# Patient Record
Sex: FEMALE | Race: WHITE | Employment: FULL TIME | URBAN - METROPOLITAN AREA
[De-identification: names, ages, dates, MRNs, and addresses within clinical notes are randomized per-mention and may not be internally consistent; named-entity substitution may affect disease eponyms.]

---

## 2017-01-16 PROCEDURE — 36415 COLL VENOUS BLD VENIPUNCTURE: CPT | Performed by: OTHER

## 2017-01-16 PROCEDURE — 80177 DRUG SCRN QUAN LEVETIRACETAM: CPT | Performed by: OTHER

## 2018-03-06 PROCEDURE — 36415 COLL VENOUS BLD VENIPUNCTURE: CPT | Performed by: OTHER

## 2018-03-06 PROCEDURE — 80175 DRUG SCREEN QUAN LAMOTRIGINE: CPT | Performed by: OTHER

## 2019-02-28 PROCEDURE — 36415 COLL VENOUS BLD VENIPUNCTURE: CPT | Performed by: OTHER

## 2019-02-28 PROCEDURE — 80175 DRUG SCREEN QUAN LAMOTRIGINE: CPT | Performed by: OTHER

## 2019-07-02 PROCEDURE — 87624 HPV HI-RISK TYP POOLED RSLT: CPT | Performed by: OBSTETRICS & GYNECOLOGY

## 2019-07-02 PROCEDURE — 88175 CYTOPATH C/V AUTO FLUID REDO: CPT | Performed by: OBSTETRICS & GYNECOLOGY

## 2021-01-18 PROBLEM — F41.9 ANXIETY: Status: ACTIVE | Noted: 2021-01-18

## 2021-01-18 PROBLEM — G40.909 SEIZURE DISORDER (HCC): Status: ACTIVE | Noted: 2021-01-18

## 2021-04-22 ENCOUNTER — TELEPHONE (OUTPATIENT)
Dept: OBGYN UNIT | Facility: HOSPITAL | Age: 31
End: 2021-04-22

## 2021-04-22 NOTE — PROGRESS NOTES
Pt contacted to inform of SHARE support services available and condolences expressed. Pt also advised that she will be asked to sign a Fetal Death Disposition Form upon admit. Choices explained, and pt verbalizes understanding. Pt states \"she doesn't think she will be using a  home\" due to the gestational age. She is aware that SHARE schedules and information are being mailed to her, and that I am available for follow up.

## 2021-04-23 ENCOUNTER — LABORATORY ENCOUNTER (OUTPATIENT)
Dept: LAB | Age: 31
End: 2021-04-23
Payer: COMMERCIAL

## 2021-04-23 ENCOUNTER — LAB ENCOUNTER (OUTPATIENT)
Dept: LAB | Age: 31
End: 2021-04-23
Attending: OBSTETRICS & GYNECOLOGY
Payer: COMMERCIAL

## 2021-04-23 DIAGNOSIS — O02.1 MISSED ABORTION: ICD-10-CM

## 2021-04-23 PROCEDURE — 36415 COLL VENOUS BLD VENIPUNCTURE: CPT

## 2021-04-23 PROCEDURE — 85027 COMPLETE CBC AUTOMATED: CPT

## 2021-04-26 ENCOUNTER — ANESTHESIA EVENT (OUTPATIENT)
Dept: SURGERY | Facility: HOSPITAL | Age: 31
End: 2021-04-26
Payer: COMMERCIAL

## 2021-04-26 ENCOUNTER — HOSPITAL ENCOUNTER (OUTPATIENT)
Facility: HOSPITAL | Age: 31
Setting detail: HOSPITAL OUTPATIENT SURGERY
Discharge: HOME | End: 2021-04-26
Attending: OBSTETRICS & GYNECOLOGY | Admitting: OBSTETRICS & GYNECOLOGY
Payer: COMMERCIAL

## 2021-04-26 ENCOUNTER — ANESTHESIA (OUTPATIENT)
Dept: SURGERY | Facility: HOSPITAL | Age: 31
End: 2021-04-26
Payer: COMMERCIAL

## 2021-04-26 VITALS
TEMPERATURE: 98 F | BODY MASS INDEX: 27.03 KG/M2 | WEIGHT: 174.25 LBS | DIASTOLIC BLOOD PRESSURE: 72 MMHG | RESPIRATION RATE: 16 BRPM | SYSTOLIC BLOOD PRESSURE: 116 MMHG | OXYGEN SATURATION: 100 % | HEART RATE: 53 BPM | HEIGHT: 67.5 IN

## 2021-04-26 DIAGNOSIS — O02.1 MISSED ABORTION: Primary | ICD-10-CM

## 2021-04-26 PROCEDURE — 86900 BLOOD TYPING SEROLOGIC ABO: CPT | Performed by: OBSTETRICS & GYNECOLOGY

## 2021-04-26 PROCEDURE — 86901 BLOOD TYPING SEROLOGIC RH(D): CPT | Performed by: OBSTETRICS & GYNECOLOGY

## 2021-04-26 PROCEDURE — 10D17ZZ EXTRACTION OF PRODUCTS OF CONCEPTION, RETAINED, VIA NATURAL OR ARTIFICIAL OPENING: ICD-10-PCS | Performed by: OBSTETRICS & GYNECOLOGY

## 2021-04-26 PROCEDURE — 88305 TISSUE EXAM BY PATHOLOGIST: CPT | Performed by: OBSTETRICS & GYNECOLOGY

## 2021-04-26 PROCEDURE — 86850 RBC ANTIBODY SCREEN: CPT | Performed by: OBSTETRICS & GYNECOLOGY

## 2021-04-26 RX ORDER — ONDANSETRON 2 MG/ML
4 INJECTION INTRAMUSCULAR; INTRAVENOUS AS NEEDED
Status: DISCONTINUED | OUTPATIENT
Start: 2021-04-26 | End: 2021-04-26

## 2021-04-26 RX ORDER — ACETAMINOPHEN 500 MG
1000 TABLET ORAL ONCE
Status: DISCONTINUED | OUTPATIENT
Start: 2021-04-26 | End: 2021-04-26

## 2021-04-26 RX ORDER — NALOXONE HYDROCHLORIDE 0.4 MG/ML
80 INJECTION, SOLUTION INTRAMUSCULAR; INTRAVENOUS; SUBCUTANEOUS AS NEEDED
Status: DISCONTINUED | OUTPATIENT
Start: 2021-04-26 | End: 2021-04-26

## 2021-04-26 RX ORDER — MIDAZOLAM HYDROCHLORIDE 1 MG/ML
INJECTION INTRAMUSCULAR; INTRAVENOUS AS NEEDED
Status: DISCONTINUED | OUTPATIENT
Start: 2021-04-26 | End: 2021-04-26 | Stop reason: SURG

## 2021-04-26 RX ORDER — SODIUM CHLORIDE, SODIUM LACTATE, POTASSIUM CHLORIDE, CALCIUM CHLORIDE 600; 310; 30; 20 MG/100ML; MG/100ML; MG/100ML; MG/100ML
INJECTION, SOLUTION INTRAVENOUS CONTINUOUS
Status: DISCONTINUED | OUTPATIENT
Start: 2021-04-26 | End: 2021-04-26

## 2021-04-26 RX ORDER — LIDOCAINE HYDROCHLORIDE 10 MG/ML
INJECTION, SOLUTION EPIDURAL; INFILTRATION; INTRACAUDAL; PERINEURAL AS NEEDED
Status: DISCONTINUED | OUTPATIENT
Start: 2021-04-26 | End: 2021-04-26 | Stop reason: SURG

## 2021-04-26 RX ORDER — METOCLOPRAMIDE HYDROCHLORIDE 5 MG/ML
10 INJECTION INTRAMUSCULAR; INTRAVENOUS AS NEEDED
Status: DISCONTINUED | OUTPATIENT
Start: 2021-04-26 | End: 2021-04-26

## 2021-04-26 RX ORDER — HYDROMORPHONE HYDROCHLORIDE 1 MG/ML
0.4 INJECTION, SOLUTION INTRAMUSCULAR; INTRAVENOUS; SUBCUTANEOUS EVERY 5 MIN PRN
Status: DISCONTINUED | OUTPATIENT
Start: 2021-04-26 | End: 2021-04-26

## 2021-04-26 RX ORDER — KETOROLAC TROMETHAMINE 30 MG/ML
INJECTION, SOLUTION INTRAMUSCULAR; INTRAVENOUS AS NEEDED
Status: DISCONTINUED | OUTPATIENT
Start: 2021-04-26 | End: 2021-04-26 | Stop reason: SURG

## 2021-04-26 RX ORDER — HYDROCODONE BITARTRATE AND ACETAMINOPHEN 5; 325 MG/1; MG/1
2 TABLET ORAL AS NEEDED
Status: DISCONTINUED | OUTPATIENT
Start: 2021-04-26 | End: 2021-04-26

## 2021-04-26 RX ORDER — ONDANSETRON 2 MG/ML
INJECTION INTRAMUSCULAR; INTRAVENOUS AS NEEDED
Status: DISCONTINUED | OUTPATIENT
Start: 2021-04-26 | End: 2021-04-26 | Stop reason: SURG

## 2021-04-26 RX ORDER — HYDROCODONE BITARTRATE AND ACETAMINOPHEN 5; 325 MG/1; MG/1
1 TABLET ORAL AS NEEDED
Status: DISCONTINUED | OUTPATIENT
Start: 2021-04-26 | End: 2021-04-26

## 2021-04-26 RX ADMIN — ONDANSETRON 4 MG: 2 INJECTION INTRAMUSCULAR; INTRAVENOUS at 15:38:00

## 2021-04-26 RX ADMIN — SODIUM CHLORIDE, SODIUM LACTATE, POTASSIUM CHLORIDE, CALCIUM CHLORIDE: 600; 310; 30; 20 INJECTION, SOLUTION INTRAVENOUS at 15:11:00

## 2021-04-26 RX ADMIN — KETOROLAC TROMETHAMINE 30 MG: 30 INJECTION, SOLUTION INTRAMUSCULAR; INTRAVENOUS at 15:38:00

## 2021-04-26 RX ADMIN — MIDAZOLAM HYDROCHLORIDE 2 MG: 1 INJECTION INTRAMUSCULAR; INTRAVENOUS at 15:14:00

## 2021-04-26 RX ADMIN — LIDOCAINE HYDROCHLORIDE 50 MG: 10 INJECTION, SOLUTION EPIDURAL; INFILTRATION; INTRACAUDAL; PERINEURAL at 15:16:00

## 2021-04-26 NOTE — ANESTHESIA POSTPROCEDURE EVALUATION
[address] Patient Status:  Hospital Outpatient Surgery   Age/Gender 26year old female MRN [id number]   [facility] SURGERY Attending Cara Figueroa MD   Hosp Day # 0 PCP Willow Sinclair MD       Anesthesia Post-o

## 2021-04-26 NOTE — H&P
HPI:  Pt here for suction D&C for missed   SIGNIFICANT HISTORY:  Past Medical History:   Diagnosis Date   • PONV (postoperative nausea and vomiting)    • Seizure ([facility] [address]    • TBI (traumatic brain injury) [facility])      Past Surgical History:   Procedure External Genitalia: Normal                      Vagina: normal                      Uterus: av, mobile, non tender, 8 week size                     Cervix: pink and clear, no lesions                     Adnexa: non tender, no masses  EXTREMITIES:  non tend

## 2021-04-26 NOTE — ANESTHESIA PREPROCEDURE EVALUATION
PRE-OP EVALUATION    Patient Name: Yuri Bonilla    Admit Diagnosis: Missed  [O02.1]    Pre-op Diagnosis: Missed  [O02.1]    DILATION AND CURETTAGE SUCTION    Anesthesia Procedure: DILATION AND CURETTAGE SUCTION (N/A )    Surgeon(s) and summary reviewed. Anesthetic Complications  (+) history of anesthetic complications  History of: PONV       GI/Hepatic/Renal    Negative GI/hepatic/renal ROS. Cardiovascular    Negative cardiovascular ROS.     Exercise toleran

## 2021-04-26 NOTE — OPERATIVE REPORT
[facility] Patient Status:  Hospital Outpatient Surgery    1990 MRN [id number]   [facility] SURGERY Attending Jaz Esteban MD   Hosp Day # 0 PCP Gio Ramos MD         OPERATIVE REPORT      PREOPERATIVE DIAGNOSIS:  Miss

## 2022-06-06 ENCOUNTER — OFFICE VISIT (OUTPATIENT)
Dept: PERINATAL CARE | Facility: HOSPITAL | Age: 32
End: 2022-06-06
Attending: OBSTETRICS & GYNECOLOGY
Payer: COMMERCIAL

## 2022-06-06 DIAGNOSIS — Z84.89 FAMILY HISTORY OF GENETIC DISORDER: ICD-10-CM

## 2022-06-06 DIAGNOSIS — G40.909 SEIZURE DISORDER (HCC): ICD-10-CM

## 2022-06-06 DIAGNOSIS — Z31.69 ENCOUNTER FOR PRECONCEPTION CONSULTATION: ICD-10-CM

## 2022-06-06 RX ORDER — MELATONIN
400 DAILY
COMMUNITY

## 2022-11-15 ENCOUNTER — TELEPHONE (OUTPATIENT)
Dept: GENETICS | Age: 32
End: 2022-11-15

## 2022-11-15 NOTE — TELEPHONE ENCOUNTER
Licha this patient has questions regarding her sister having blood testing. She left you a voicemail.

## 2022-12-28 ENCOUNTER — APPOINTMENT (OUTPATIENT)
Dept: GENETICS | Age: 32
End: 2022-12-28
Attending: GENETIC COUNSELOR, MS
Payer: COMMERCIAL

## 2022-12-28 ENCOUNTER — APPOINTMENT (OUTPATIENT)
Dept: HEMATOLOGY/ONCOLOGY | Age: 32
End: 2022-12-28
Attending: GENETIC COUNSELOR, MS
Payer: COMMERCIAL

## 2023-06-28 ENCOUNTER — APPOINTMENT (OUTPATIENT)
Dept: GENETICS | Age: 33
End: 2023-06-28
Attending: GENETIC COUNSELOR, MS
Payer: COMMERCIAL

## 2023-06-28 ENCOUNTER — APPOINTMENT (OUTPATIENT)
Dept: HEMATOLOGY/ONCOLOGY | Age: 33
End: 2023-06-28
Attending: GENETIC COUNSELOR, MS
Payer: COMMERCIAL

## 2024-10-08 ENCOUNTER — OFFICE VISIT (OUTPATIENT)
Dept: PERINATAL CARE | Facility: HOSPITAL | Age: 34
End: 2024-10-08
Attending: OBSTETRICS & GYNECOLOGY
Payer: COMMERCIAL

## 2024-10-08 VITALS
WEIGHT: 188 LBS | BODY MASS INDEX: 28.49 KG/M2 | SYSTOLIC BLOOD PRESSURE: 110 MMHG | HEART RATE: 78 BPM | HEIGHT: 68 IN | DIASTOLIC BLOOD PRESSURE: 64 MMHG

## 2024-10-08 DIAGNOSIS — S06.9XAA TRAUMATIC BRAIN INJURY (HCC): ICD-10-CM

## 2024-10-08 DIAGNOSIS — G40.909 SEIZURE DISORDER (HCC): ICD-10-CM

## 2024-10-08 DIAGNOSIS — O09.891 MEDICATION EXPOSURE DURING FIRST TRIMESTER OF PREGNANCY (HCC): ICD-10-CM

## 2024-10-08 DIAGNOSIS — S06.9X9S TRAUMATIC BRAIN INJURY WITH LOSS OF CONSCIOUSNESS, SEQUELA (HCC): ICD-10-CM

## 2024-10-08 DIAGNOSIS — G40.909 SEIZURE DISORDER (HCC): Primary | ICD-10-CM

## 2024-10-08 DIAGNOSIS — O09.512 PRIMIGRAVIDA OF ADVANCED MATERNAL AGE IN SECOND TRIMESTER (HCC): ICD-10-CM

## 2024-10-08 PROBLEM — O30.041 DICHORIONIC DIAMNIOTIC TWIN PREGNANCY IN FIRST TRIMESTER (HCC): Status: ACTIVE | Noted: 2024-10-08

## 2024-10-08 PROCEDURE — 76811 OB US DETAILED SNGL FETUS: CPT | Performed by: OBSTETRICS & GYNECOLOGY

## 2024-10-08 NOTE — PROGRESS NOTES
Outpatient Maternal-Fetal Medicine Consultation    Dear Dr. Asher,    Thank you for requesting ultrasound evaluation and maternal fetal medicine consultation on your patient Mala Laurent.  As you are aware she is a 34 year old female with a Tong pregnancy at 20w0d.  A maternal-fetal medicine consultation was requested secondary to advanced maternal age and seat maternal seizure disorder.  Her prenatal records and labs were reviewed.    HISTORY  OB History    Para Term  AB Living   2 0 0 0 1 0   SAB IAB Ectopic Multiple Live Births   1 0 0 0 0     # 1 - Date: 22, Sex: None, Weight: None, GA: None, Type: None, Apgar1: None, Apgar5: None, Living: None, Birth Comments: D&C    # 2 - Date: None, Sex: None, Weight: None, GA: None, Type: None, Apgar1: None, Apgar5: None, Living: None, Birth Comments: None    Past Medical History  The patient  has a past medical history of PONV (postoperative nausea and vomiting), Seizure ([facility] and TBI (traumatic brain injury) ([facility]).    She has no past medical history of Difficult intubation, Malignant hyperthermia, or Pseudocholinesterase deficiency.    Past Surgical History  The patient  has a past surgical history that includes Brain Surgery.    Family History  The patient She indicated that her mother is alive. She indicated that her father is alive.      Medications:   Current Outpatient Medications:     lamoTRIgine (LAMICTAL) 25 MG Oral Tab, Take 1 tablet (25 mg total) by mouth 2 (two) times daily., Disp: 180 tablet, Rfl: 3    lamoTRIgine (LAMICTAL) 200 MG Oral Tab, Take 1 tablet (200 mg total) by mouth 2 (two) times daily., Disp: 180 tablet, Rfl: 3    Prenatal Multivit-Min-Fe-FA (PRENATAL VITAMINS OR), Take by mouth daily., Disp: , Rfl:     cetirizine 10 MG Oral Tab, Take 1 tablet (10 mg total) by mouth daily. 1 tablet daily PRN, Disp: , Rfl:     folic acid 400 MCG Oral Tab, Take 400 mcg by mouth daily., Disp: , Rfl:     ALPRAZOLAM 0.25 MG Oral Tab, TAKE  1 TABLET(0.25 MG) BY MOUTH EVERY 6 HOURS AS NEEDED FOR ANXIETY (Patient not taking: Reported on 10/8/2024), Disp: 30 tablet, Rfl: 0    DiphenhydrAMINE HCl 25 MG Oral Cap, Take 25 mg by mouth every 6 (six) hours as needed for Itching. (Patient not taking: Reported on 10/8/2024), Disp: , Rfl:   Allergies:   Allergies   Allergen Reactions    Pcn [Penicillins] RASH         PHYSICAL EXAMINATION:  /64 (BP Location: Right arm, Patient Position: Sitting, Cuff Size: adult)   Pulse 78   Ht 5' 8\" (1.727 m)   Wt 188 lb (85.3 kg)   LMP 05/21/2024   BMI 28.59 kg/m²   General: alert and oriented,no acute distress  Abdomen: gravid, soft, non-tender  Extremities: non-tender, no edema      OBSTETRIC ULTRASOUND  The patient had a level 2 ultrasound today which I interpreted the results and reviewed them with the patient.    Ultrasound Findings:  Single IUP in cephalic presentation.    Placenta is anterior.   A 3 vessel cord is noted.  Cardiac activity is present at 148 bpm   g ( 0 lb 14 oz);   MVP is 3.7 cm .   The profile appears normal but suboptimal due to fetal position.    The fetal measurements are consistent with the established EDC. No ultrasound evidence of structural abnormalities are seen today. The nasal bone is present. No ultrasound evidence of markers for aneuploidy are seen. She understands that ultrasound exam cannot exclude genetic abnormalities and that genetic testing is recommended. The limitations of ultrasound were discussed.     Uterus and adnexa appeared normal  today on US    See imaging tab for the complete US report.    DISCUSSION  During her visit we discussed and reviewed the following issues:  Seizure d/o secondary to traumatic brain injury -     Lamictal has been found to decrease folate concentrations in animal studies. Teratogenic effects in animals were not observed. Lamictal crosses the human placenta and can be measured in the plasma of exposed newborns. Preliminary data from the  [place] American Antiepileptic Drug Pregnancy Registry (NAAED) suggest an increased incidence of cleft lip and/or cleft palate following first trimester exposure. Healthcare providers may enroll patients in the Lamotrigine(Lamictal ) Pregnancy Registry by calling [phone].  Lamictal is not recommended during breastfeeding.     ADVANCED MATERNAL AGE    Background  I reviewed with the patient that pregnancies in women of advanced maternal age (35 or older at delivery) are associated with elevated risks. Specifically, there is a higher rate of:  Fetal malformations  Preeclampsia  Gestational diabetes  Intrauterine fetal death    As a result, enhanced pregnancy surveillance is advised for these patients including a comprehensive ultrasound to assess for fetal malformations (at 20 weeks) and a third trimester ultrasound assessment for fetal growth (at 32 weeks). In addition, weekly NST's (initiating at 36 weeks gestation for women 35-39 years and at 32 weeks gestation for women 40 years and older) are also advised. Routine obstetric care is more than adequate to assess for gestational diabetes and preeclampsia; hence, no further significant alterations in obstetric care are advised.    Medical Complications    Women 35 years of age or older can expect to experience two to three fold higher rates of hospitalization,  delivery, and pregnancy-related complications when compared to their younger counterparts.  The two most common medical problems complicating these  pregnanccies are hypertension and diabetes.   The incidence of preeclampsia in the general obstetric population is 3 to 4 percent; this increases to 5 to 10 percent in women over age 40 and is as high as 35 percent in women over age 50.   The incidence of gestational diabetes in the general obstetric population is 3 percent, rising to 7 to 12 percent in women over age 40 and 20 percent in women over age 50.  Women 35 years of age or older are more  likely to be delivered by . The  delivery rate in the general obstetric population of the [place] is almost 30 percent, compared to almost 50 percent in women over age 40 to 45 and almost 80 percent in women age 50 to 63.          Fetal Death        A decision analysis tool using data from the [place] Obstetrical  Database predicted a strategy of weekly antepartum testing and labor induction would lower the risk of unexplained fetal death in women 35 years of age or older. In this model, weekly testing starting at 36 weeks of gestation would drop the risk of fetal death from 5.2 to 1.3 per 1000 pregnancies. While a policy of antepartum testing in older women does increase the chance that a women will be induced (71 inductions per fetal death averted) and thereby increases her risk of having a  delivery, only 14 additional cesareans would need to be performed to avert one unexplained fetal death.  Hence, weekly NST's are advised for women of advanced maternal age; testing should be initiated at 36 weeks for women 35-39 years and at 32 weeks for women 40 years and older.    Fetal Malformations    Cardiac malformations, clubfoot, and diaphragmatic hernia appear to occur with increased frequency in offspring of older women. These abnormalities are structural and unrelated to aneuploidy, thus they would not be detected by karyotype analysis.  For these reasons a complete, detailed ultrasound (level II) is advised even if the fetus has a normal karyotype.      Fetal Aneuploidy  We also discussed the increased risk of chromosomal abnormalities associated with advanced maternal age. I reviewed that an ultrasound examination cannot reliably exclude potential genetic abnormalities. Given that the patient will be 34 years old at the time of delivery I reviewed that her risk (at amniocentesis) of having a fetus with any chromosome abnormality is 1:100 and with trisomy 21 is 1:  190.    Invasive Testing  I offered invasive genetic testing (amniocentesis, chorionic villus sampling) after reviewing the diagnostic accuracy of these tests as well as the procedure associated loss rate (1:500 for genetic amniocentesis).    She ultimately does not desire invasive genetic testing.     Non-invasive Pregnancy Testing (NIPT) -we reviewed her Loiselle free DNA screen and the role of level 2 ultrasound.  We discussed her low risk cell free DNA screen and her normal level II ultrasound today.  We discussed her option for amniocentesis but that the likelihood of finding a genetic concern is quite low after her other low risk evaluations.  She appropriately declined invasive prenatal genetic diagnostic testing.    We discussed the recommended plan of care based on her  risk factors.  Mala and her significant other, Efe, had their questions answered to their satisfaction.      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 Levy    RECOMMENDATIONS:  Continue care with Dr. Asher  She will continue to follow with her neurologist forFollow-up growth & BPP ultrasound at 32 weeks.  Weekly NST's at 36 weeks.  Intermittent Lamictal levels and dose adjustments as clinically needed      Total time spent 55 minutes this calendar day which includes preparing to see the patient including chart review, obtaining and/or reviewing additional medical history, performing a physical exam and evaluation, documenting clinical information in the electronic medical record, independently interpreting results, counseling the patient, communicating results to the patient/family/caregiver and coordinating care.     Case discussed with patient who demonstrated understanding and agreement with plan.     Thank  you for allowing me to participate in the care of this patient.  Please feel free to contact me with any questions.    Marjorie Cui MD  Maternal-Fetal Medicine       Note to patient and family:  The 21st Century Cures Act makes medical notes available to patients in the interest of transparency.  However, please be advised that this is a medical document.  It is intended as a peer to peer communication.  It is written in medical language and may contain abbreviations or verbiage that are technical and unfamiliar.  It may appear blunt or direct.  Medical documents are intended to carry relevant information, facts as evident, and the clinical opinion of the practitioner.

## 2024-12-23 NOTE — PROGRESS NOTES
Outpatient Maternal-Fetal Medicine Follow-Up    Dear Dr. Asher    Thank you for requesting ultrasound evaluation and maternal fetal medicine consultation on your patient Mala Laurent.  As you are aware she is a 34 year old female  with a grant pregnancy and an Estimated Date of Delivery: 25.  She returned to maternal-fetal medicine today for a follow-up visit.  Her history was reviewed from her prior visit and there were no interval changes.    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 Levy    PHYSICAL EXAMINATION:  /64 (BP Location: Right arm, Patient Position: Sitting, Cuff Size: adult)   Pulse 90   Ht 5' 8\" (1.727 m)   Wt 222 lb (100.7 kg)   LMP 2024   BMI 33.75 kg/m²   General: alert and oriented, no acute distress  Abdomen: gravid, soft, non-tender  Extremities: non-tender, no edema    OBSTETRIC ULTRASOUND    Ultrasound Findings:  Single IUP in cephalic presentation.    Placenta is anterior.   A 3 vessel cord is noted.  Cardiac activity is present at 126 bpm  EFW 2202 g ( 4 lb 14 oz); 64%.    RICARDO is  17.9 cm.  MVP is 6.4 cm  BPP is 8/8.     The fetal measurements are consistent with established EDC. No gross ultrasound evidence of structural abnormalities are seen today. The patient understands that ultrasound cannot rule out all structural and chromosomal abnormalities.     See PACS/Imaging Tab For Complete Ultrasound Report  I interpreted the results and reviewed them with the patient.    DISCUSSION  During her visit we discussed and reviewed the following issues:  ADVANCED MATERNAL AGE  See prior MFM notes for a detailed review.  She did not desire invasive genetic testing.   She has already obtained a low-risk NPIT result and was appropriately reassured.     SEIZURE DISORDER  See prior MFM notes for a detailed review.    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 Levy     RECOMMENDATIONS:  Continue care with Dr. Asher  Weekly NST's at 36 weeks.  Intermittent Lamictal levels and dose adjustments as clinically needed    Thank you for allowing me to participate in the care of your patient.  Please do not hesitate to contact me if additional questions or concerns arise.      Jesus Ruiz M.D.    20 minutes spent in review of records, patient consultation, documentation and coordination of care.  The relevant clinical matter(s) are summarized above.     Note to patient and family  The 21st Century Cures Act makes medical notes available to patients in the interest of transparency.  However, please be advised that this is a medical document.  It is intended as obrd-bo-twzc communication.  It is written and medical language may contain abbreviations or verbiage that are technical and unfamiliar.  It may appear blunt or direct.  Medical documents are intended to carry relevant information, facts as evident, and the clinical opinion of the practitioner.

## 2025-01-02 ENCOUNTER — ULTRASOUND ENCOUNTER (OUTPATIENT)
Dept: PERINATAL CARE | Facility: HOSPITAL | Age: 35
End: 2025-01-02
Attending: OBSTETRICS & GYNECOLOGY
Payer: COMMERCIAL

## 2025-01-02 VITALS
WEIGHT: 222 LBS | DIASTOLIC BLOOD PRESSURE: 64 MMHG | BODY MASS INDEX: 33.65 KG/M2 | HEIGHT: 68 IN | SYSTOLIC BLOOD PRESSURE: 114 MMHG | HEART RATE: 90 BPM

## 2025-01-02 DIAGNOSIS — O09.529 AMA (ADVANCED MATERNAL AGE) MULTIGRAVIDA 35+ (HCC): ICD-10-CM

## 2025-01-02 DIAGNOSIS — O99.353 SEIZURE DISORDER DURING PREGNANCY IN THIRD TRIMESTER (HCC): ICD-10-CM

## 2025-01-02 DIAGNOSIS — O09.523 MULTIGRAVIDA OF ADVANCED MATERNAL AGE IN THIRD TRIMESTER (HCC): ICD-10-CM

## 2025-01-02 DIAGNOSIS — O09.529 AMA (ADVANCED MATERNAL AGE) MULTIGRAVIDA 35+ (HCC): Primary | ICD-10-CM

## 2025-01-02 DIAGNOSIS — G40.909 SEIZURE DISORDER (HCC): ICD-10-CM

## 2025-01-02 DIAGNOSIS — G40.909 SEIZURE DISORDER DURING PREGNANCY IN THIRD TRIMESTER (HCC): ICD-10-CM

## 2025-01-02 PROCEDURE — 76819 FETAL BIOPHYS PROFIL W/O NST: CPT

## 2025-01-02 PROCEDURE — 76816 OB US FOLLOW-UP PER FETUS: CPT | Performed by: OBSTETRICS & GYNECOLOGY

## 2025-02-19 ENCOUNTER — TELEPHONE (OUTPATIENT)
Dept: OBGYN UNIT | Facility: HOSPITAL | Age: 35
End: 2025-02-19

## 2025-02-20 ENCOUNTER — HOSPITAL ENCOUNTER (INPATIENT)
Facility: HOSPITAL | Age: 35
LOS: 5 days | Discharge: HOME | End: 2025-02-25
Attending: OBSTETRICS & GYNECOLOGY | Admitting: OBSTETRICS & GYNECOLOGY
Payer: COMMERCIAL

## 2025-02-20 ENCOUNTER — APPOINTMENT (OUTPATIENT)
Dept: OBGYN CLINIC | Facility: HOSPITAL | Age: 35
End: 2025-02-20
Payer: COMMERCIAL

## 2025-02-20 PROBLEM — Z34.90 PREGNANCY (HCC): Status: ACTIVE | Noted: 2025-02-20

## 2025-02-20 LAB
ANTIBODY SCREEN: NEGATIVE
BASOPHILS # BLD AUTO: 0.02 X10(3) UL (ref 0–0.2)
BASOPHILS NFR BLD AUTO: 0.2 %
EOSINOPHIL # BLD AUTO: 0.18 X10(3) UL (ref 0–0.7)
EOSINOPHIL NFR BLD AUTO: 1.8 %
ERYTHROCYTE [DISTWIDTH] IN BLOOD BY AUTOMATED COUNT: 12.7 %
HCT VFR BLD AUTO: 33.3 %
HGB BLD-MCNC: 11.1 G/DL
IMM GRANULOCYTES # BLD AUTO: 0.12 X10(3) UL (ref 0–1)
IMM GRANULOCYTES NFR BLD: 1.2 %
LYMPHOCYTES # BLD AUTO: 2.05 X10(3) UL (ref 1–4)
LYMPHOCYTES NFR BLD AUTO: 20.2 %
MCH RBC QN AUTO: 29.9 PG (ref 26–34)
MCHC RBC AUTO-ENTMCNC: 33.3 G/DL (ref 31–37)
MCV RBC AUTO: 89.8 FL
MONOCYTES # BLD AUTO: 0.76 X10(3) UL (ref 0.1–1)
MONOCYTES NFR BLD AUTO: 7.5 %
NEUTROPHILS # BLD AUTO: 7 X10 (3) UL (ref 1.5–7.7)
NEUTROPHILS # BLD AUTO: 7 X10(3) UL (ref 1.5–7.7)
NEUTROPHILS NFR BLD AUTO: 69.1 %
PLATELET # BLD AUTO: 229 10(3)UL (ref 150–450)
RBC # BLD AUTO: 3.71 X10(6)UL
RH BLOOD TYPE: POSITIVE
T PALLIDUM AB SER QL IA: NONREACTIVE
WBC # BLD AUTO: 10.1 X10(3) UL (ref 4–11)

## 2025-02-20 PROCEDURE — 3E0P7VZ INTRODUCTION OF HORMONE INTO FEMALE REPRODUCTIVE, VIA NATURAL OR ARTIFICIAL OPENING: ICD-10-PCS | Performed by: OBSTETRICS & GYNECOLOGY

## 2025-02-20 RX ORDER — CITRIC ACID/SODIUM CITRATE 334-500MG
30 SOLUTION, ORAL ORAL AS NEEDED
Status: COMPLETED | OUTPATIENT
Start: 2025-02-20 | End: 2025-02-22

## 2025-02-20 RX ORDER — ROPIVACAINE HYDROCHLORIDE 5 MG/ML
30 INJECTION, SOLUTION EPIDURAL; INFILTRATION; PERINEURAL AS NEEDED
Status: DISCONTINUED | OUTPATIENT
Start: 2025-02-20 | End: 2025-02-22 | Stop reason: HOSPADM

## 2025-02-20 RX ORDER — DEXTROSE, SODIUM CHLORIDE, SODIUM LACTATE, POTASSIUM CHLORIDE, AND CALCIUM CHLORIDE 5; .6; .31; .03; .02 G/100ML; G/100ML; G/100ML; G/100ML; G/100ML
INJECTION, SOLUTION INTRAVENOUS AS NEEDED
Status: DISCONTINUED | OUTPATIENT
Start: 2025-02-20 | End: 2025-02-22 | Stop reason: HOSPADM

## 2025-02-20 RX ORDER — ACETAMINOPHEN 500 MG
500 TABLET ORAL EVERY 6 HOURS PRN
Status: DISCONTINUED | OUTPATIENT
Start: 2025-02-20 | End: 2025-02-22 | Stop reason: HOSPADM

## 2025-02-20 RX ORDER — ACETAMINOPHEN 500 MG
1000 TABLET ORAL EVERY 6 HOURS PRN
Status: DISCONTINUED | OUTPATIENT
Start: 2025-02-20 | End: 2025-02-22 | Stop reason: HOSPADM

## 2025-02-20 RX ORDER — TERBUTALINE SULFATE 1 MG/ML
0.25 INJECTION SUBCUTANEOUS AS NEEDED
Status: DISCONTINUED | OUTPATIENT
Start: 2025-02-20 | End: 2025-02-22 | Stop reason: HOSPADM

## 2025-02-20 RX ORDER — CALCIUM CARBONATE 500 MG/1
1000 TABLET, CHEWABLE ORAL EVERY 4 HOURS PRN
Status: DISCONTINUED | OUTPATIENT
Start: 2025-02-20 | End: 2025-02-22 | Stop reason: HOSPADM

## 2025-02-20 RX ORDER — SODIUM CHLORIDE, SODIUM LACTATE, POTASSIUM CHLORIDE, CALCIUM CHLORIDE 600; 310; 30; 20 MG/100ML; MG/100ML; MG/100ML; MG/100ML
INJECTION, SOLUTION INTRAVENOUS CONTINUOUS
Status: DISCONTINUED | OUTPATIENT
Start: 2025-02-20 | End: 2025-02-22 | Stop reason: HOSPADM

## 2025-02-20 RX ORDER — ONDANSETRON 2 MG/ML
4 INJECTION INTRAMUSCULAR; INTRAVENOUS EVERY 6 HOURS PRN
Status: DISCONTINUED | OUTPATIENT
Start: 2025-02-20 | End: 2025-02-22 | Stop reason: HOSPADM

## 2025-02-20 RX ORDER — IBUPROFEN 600 MG/1
600 TABLET, FILM COATED ORAL ONCE AS NEEDED
Status: DISCONTINUED | OUTPATIENT
Start: 2025-02-20 | End: 2025-02-22 | Stop reason: HOSPADM

## 2025-02-21 PROCEDURE — 3E033VJ INTRODUCTION OF OTHER HORMONE INTO PERIPHERAL VEIN, PERCUTANEOUS APPROACH: ICD-10-PCS | Performed by: OBSTETRICS & GYNECOLOGY

## 2025-02-21 RX ORDER — HYDROMORPHONE HYDROCHLORIDE 1 MG/ML
1 INJECTION, SOLUTION INTRAMUSCULAR; INTRAVENOUS; SUBCUTANEOUS
Status: DISCONTINUED | OUTPATIENT
Start: 2025-02-21 | End: 2025-02-22

## 2025-02-21 NOTE — PROGRESS NOTES
Pt received cytotec overnight for ripening.  This AM was still 0/50 so 4th dose given  Denied feeling cramps  FHR has been reassuring and pt w/some contractions noted on monitor.    Reviewed plans for IOL  Antibiotic started for GBS prophylaxis

## 2025-02-21 NOTE — PROGRESS NOTES
Pt is a 34 year old female admitted to -A.     Chief Complaint   Patient presents with    Scheduled Induction      Pt is  39w2d intra-uterine pregnancy.  History obtained, consents signed. Oriented to room, staff, and plan of care.

## 2025-02-21 NOTE — PLAN OF CARE
Problem: BIRTH - VAGINAL/ SECTION  Goal: Fetal and maternal status remain reassuring during the birth process  Description: INTERVENTIONS:  - Monitor vital signs  - Monitor fetal heart rate  - Monitor uterine activity  - Monitor labor progression (vaginal delivery)  - DVT prophylaxis (C/S delivery)  - Surgical antibiotic prophylaxis (C/S delivery)  Outcome: Progressing     Problem: PAIN - ADULT  Goal: Verbalizes/displays adequate comfort level or patient's stated pain goal  Description: INTERVENTIONS:  - Encourage pt to monitor pain and request assistance  - Assess pain using appropriate pain scale  - Administer analgesics based on type and severity of pain and evaluate response  - Implement non-pharmacological measures as appropriate and evaluate response  - Consider cultural and social influences on pain and pain management  - Manage/alleviate anxiety  - Utilize distraction and/or relaxation techniques  - Monitor for opioid side effects  - Notify MD/LIP if interventions unsuccessful or patient reports new pain  - Anticipate increased pain with activity and pre-medicate as appropriate  Outcome: Progressing     Problem: ANXIETY  Goal: Will report anxiety at manageable levels  Description: INTERVENTIONS:  - Administer medication as ordered  - Teach and rehearse alternative coping skills  - Provide emotional support with 1:1 interaction with staff  Outcome: Progressing     Problem: Patient/Family Goals  Goal: Patient/Family Long Term Goal  Description: Patient's Long Term Goal: Uncomplicated Vaginal Delivery  Interventions:  Ice chips and sips as tolerated  Antibiotics as needed per protocol  Informed consent  - See additional Care Plan goals for specific interventions  Outcome: Progressing  Goal: Patient/Family Short Term Goal  Description: Patient's Short Term Goal: Adequate Pain Control with Delivery of Infant  Interventions:  Patient scores pain a \"3\" or less  Multidisciplinary care   Nonpharmacologic  comfort measures  - See additional Care Plan goals for specific interventions  Outcome: Progressing

## 2025-02-21 NOTE — H&P
[facility]  History & Physical    Mala Laurent Patient Status:  Inpatient    1990 MRN [id number]   Location [facility] LABOR & DELIVERY Attending Lesia Trejo MD   Hosp Day # 0 PCP Adonay Black MD     SUBJECTIVE:    Mala Laurent is a 34 year old  woman at 39w2d gestation who is being admitted for scheduled induction.    US EFW 90%, AC 99%, RICARDO 24cm--risk of shoulder dystocia discussed in the office; pt desires IOL.  She reports good fetal movement, no vaginal bleeding, and no contractions.  She reports no headache, vision changes and RUQ pain.  Her pregnancy is significant for AMA, h/o seizure d/o on lamictal.    Obstetric History:   OB History    Para Term  AB Living   2 0 0 0 1 0   SAB IAB Ectopic Multiple Live Births   1 0 0 0 0      # Outcome Date GA Lbr Butch/2nd Weight Sex Type Anes PTL Lv   2 Current            1 SAB 22              Birth Comments: D&C     Past Medical History:   Past Medical History:    PONV (postoperative nausea and vomiting)    Seizure (HCC)    TBI (traumatic brain injury) ([facility])     Past Social History:   Past Surgical History:   Procedure Laterality Date    Brain surgery      s/p craniotomy x2     Family History: History reviewed. No pertinent family history.  Social History:   Social History     Tobacco Use    Smoking status: Former     Current packs/day: 0.00     Types: Cigarettes     Quit date: 2013     Years since quittin.8    Smokeless tobacco: Never   Substance Use Topics    Alcohol use: Not Currently       Home Meds: Prescriptions Prior to Admission[1]  Allergies: Allergies[2]    OBJECTIVE:    Temp:  [97.9 °F (36.6 °C)] 97.9 °F (36.6 °C)  Pulse:  [86] 86  BP: (135)/(73) 135/73    Abdomen: {soft, gravid, nontender   Fetal Surveillance:  130s BPM   mod variability, + accels, rare variable decel   [place]: irreg   Cervix: Cl/50/-3     Lab Review:    No visits with results within 1 Day(s) from this visit.    Latest known visit with results is:   Office Visit on 05/18/2021   Component Date Value    Hemoglobin 05/18/2021 12.9     [place] Lot # 05/18/2021 [id number]     [place] Expiration Date 05/18/2021 02/26/2022         ASSESSMENT:    39w2d gestation here for labor induction  Obstetrical history significant for suspected macrosomia, AMA, maternal seizure d/o.    PLAN:    Fetal heart tracing reassuring  Induction of labor with cytotec per protocol  GBS+--plan ampicillin per protocol  Continue lamictal               [1]   Medications Prior to Admission   Medication Sig Dispense Refill Last Dose/Taking    lamoTRIgine (LAMICTAL) 25 MG Oral Tab Take 1 tablet (25 mg total) by mouth 2 (two) times daily. (Patient taking differently: Take 2 tablets (50 mg total) by mouth 2 (two) times daily.) 180 tablet 3 2/20/2025 Evening    lamoTRIgine (LAMICTAL) 200 MG Oral Tab Take 1 tablet (200 mg total) by mouth 2 (two) times daily. 180 tablet 3 2/20/2025 Evening    Prenatal Multivit-Min-Fe-FA (PRENATAL VITAMINS OR) Take by mouth daily.   2/20/2025    cetirizine 10 MG Oral Tab Take 1 tablet (10 mg total) by mouth daily. 1 tablet daily PRN   2/20/2025    folic acid 400 MCG Oral Tab Take 400 mcg by mouth daily.       ALPRAZOLAM 0.25 MG Oral Tab TAKE 1 TABLET(0.25 MG) BY MOUTH EVERY 6 HOURS AS NEEDED FOR ANXIETY (Patient not taking: Reported on 10/8/2024) 30 tablet 0     DiphenhydrAMINE HCl 25 MG Oral Cap Take 25 mg by mouth every 6 (six) hours as needed for Itching. (Patient not taking: Reported on 10/8/2024)      [2]   Allergies  Allergen Reactions    Pcn [Penicillins] RASH

## 2025-02-22 ENCOUNTER — ANESTHESIA (OUTPATIENT)
Dept: OBGYN UNIT | Facility: HOSPITAL | Age: 35
End: 2025-02-22
Payer: COMMERCIAL

## 2025-02-22 ENCOUNTER — ANESTHESIA EVENT (OUTPATIENT)
Dept: OBGYN UNIT | Facility: HOSPITAL | Age: 35
End: 2025-02-22
Payer: COMMERCIAL

## 2025-02-22 PROCEDURE — 59514 CESAREAN DELIVERY ONLY: CPT | Performed by: OBSTETRICS & GYNECOLOGY

## 2025-02-22 RX ORDER — DIPHENHYDRAMINE HYDROCHLORIDE 50 MG/ML
12.5 INJECTION INTRAMUSCULAR; INTRAVENOUS EVERY 4 HOURS PRN
Status: DISCONTINUED | OUTPATIENT
Start: 2025-02-22 | End: 2025-02-25

## 2025-02-22 RX ORDER — NALBUPHINE HYDROCHLORIDE 10 MG/ML
2.5 INJECTION INTRAMUSCULAR; INTRAVENOUS; SUBCUTANEOUS EVERY 4 HOURS PRN
Status: DISCONTINUED | OUTPATIENT
Start: 2025-02-22 | End: 2025-02-25

## 2025-02-22 RX ORDER — FAMOTIDINE 10 MG/ML
20 INJECTION, SOLUTION INTRAVENOUS 2 TIMES DAILY
Status: DISCONTINUED | OUTPATIENT
Start: 2025-02-22 | End: 2025-02-24

## 2025-02-22 RX ORDER — GABAPENTIN 300 MG/1
300 CAPSULE ORAL EVERY 8 HOURS PRN
Status: DISCONTINUED | OUTPATIENT
Start: 2025-02-22 | End: 2025-02-25

## 2025-02-22 RX ORDER — ONDANSETRON 2 MG/ML
4 INJECTION INTRAMUSCULAR; INTRAVENOUS ONCE AS NEEDED
Status: DISCONTINUED | OUTPATIENT
Start: 2025-02-22 | End: 2025-02-22 | Stop reason: HOSPADM

## 2025-02-22 RX ORDER — SODIUM CHLORIDE, SODIUM LACTATE, POTASSIUM CHLORIDE, CALCIUM CHLORIDE 600; 310; 30; 20 MG/100ML; MG/100ML; MG/100ML; MG/100ML
INJECTION, SOLUTION INTRAVENOUS CONTINUOUS
Status: DISCONTINUED | OUTPATIENT
Start: 2025-02-22 | End: 2025-02-25

## 2025-02-22 RX ORDER — BUPIVACAINE HYDROCHLORIDE 7.5 MG/ML
INJECTION, SOLUTION INTRASPINAL AS NEEDED
Status: DISCONTINUED | OUTPATIENT
Start: 2025-02-22 | End: 2025-02-22 | Stop reason: SURG

## 2025-02-22 RX ORDER — NALBUPHINE HYDROCHLORIDE 10 MG/ML
2.5 INJECTION INTRAMUSCULAR; INTRAVENOUS; SUBCUTANEOUS
Status: DISCONTINUED | OUTPATIENT
Start: 2025-02-22 | End: 2025-02-22 | Stop reason: HOSPADM

## 2025-02-22 RX ORDER — PHENYLEPHRINE HCL 10 MG/ML
VIAL (ML) INJECTION AS NEEDED
Status: DISCONTINUED | OUTPATIENT
Start: 2025-02-22 | End: 2025-02-22 | Stop reason: SURG

## 2025-02-22 RX ORDER — DEXTROSE, SODIUM CHLORIDE, SODIUM LACTATE, POTASSIUM CHLORIDE, AND CALCIUM CHLORIDE 5; .6; .31; .03; .02 G/100ML; G/100ML; G/100ML; G/100ML; G/100ML
INJECTION, SOLUTION INTRAVENOUS CONTINUOUS PRN
Status: DISCONTINUED | OUTPATIENT
Start: 2025-02-22 | End: 2025-02-25

## 2025-02-22 RX ORDER — SIMETHICONE 80 MG
80 TABLET,CHEWABLE ORAL 3 TIMES DAILY PRN
Status: DISCONTINUED | OUTPATIENT
Start: 2025-02-22 | End: 2025-02-25

## 2025-02-22 RX ORDER — ACETAMINOPHEN 500 MG
1000 TABLET ORAL ONCE
Status: DISCONTINUED | OUTPATIENT
Start: 2025-02-22 | End: 2025-02-22 | Stop reason: HOSPADM

## 2025-02-22 RX ORDER — METOCLOPRAMIDE HYDROCHLORIDE 5 MG/ML
10 INJECTION INTRAMUSCULAR; INTRAVENOUS EVERY 6 HOURS
Status: DISCONTINUED | OUTPATIENT
Start: 2025-02-22 | End: 2025-02-23

## 2025-02-22 RX ORDER — KETOROLAC TROMETHAMINE 30 MG/ML
INJECTION, SOLUTION INTRAMUSCULAR; INTRAVENOUS
Status: COMPLETED
Start: 2025-02-22 | End: 2025-02-22

## 2025-02-22 RX ORDER — BISACODYL 10 MG
10 SUPPOSITORY, RECTAL RECTAL ONCE AS NEEDED
Status: DISCONTINUED | OUTPATIENT
Start: 2025-02-22 | End: 2025-02-25

## 2025-02-22 RX ORDER — KETOROLAC TROMETHAMINE 30 MG/ML
30 INJECTION, SOLUTION INTRAMUSCULAR; INTRAVENOUS EVERY 6 HOURS
Status: DISPENSED | OUTPATIENT
Start: 2025-02-22 | End: 2025-02-23

## 2025-02-22 RX ORDER — EPHEDRINE SULFATE 50 MG/ML
INJECTION INTRAVENOUS AS NEEDED
Status: DISCONTINUED | OUTPATIENT
Start: 2025-02-22 | End: 2025-02-22 | Stop reason: SURG

## 2025-02-22 RX ORDER — MORPHINE SULFATE 2 MG/ML
INJECTION, SOLUTION INTRAMUSCULAR; INTRAVENOUS AS NEEDED
Status: DISCONTINUED | OUTPATIENT
Start: 2025-02-22 | End: 2025-02-22 | Stop reason: SURG

## 2025-02-22 RX ORDER — KETOROLAC TROMETHAMINE 30 MG/ML
30 INJECTION, SOLUTION INTRAMUSCULAR; INTRAVENOUS ONCE
Status: COMPLETED | OUTPATIENT
Start: 2025-02-22 | End: 2025-02-22

## 2025-02-22 RX ORDER — AMMONIA 15 % (W/V)
0.3 AMPUL (EA) INHALATION AS NEEDED
Status: DISCONTINUED | OUTPATIENT
Start: 2025-02-22 | End: 2025-02-25

## 2025-02-22 RX ORDER — DEXAMETHASONE SODIUM PHOSPHATE 4 MG/ML
VIAL (ML) INJECTION AS NEEDED
Status: DISCONTINUED | OUTPATIENT
Start: 2025-02-22 | End: 2025-02-22 | Stop reason: SURG

## 2025-02-22 RX ORDER — METHYLERGONOVINE MALEATE 0.2 MG/ML
INJECTION INTRAVENOUS AS NEEDED
Status: DISCONTINUED | OUTPATIENT
Start: 2025-02-22 | End: 2025-02-22 | Stop reason: SURG

## 2025-02-22 RX ORDER — NALOXONE HYDROCHLORIDE 0.4 MG/ML
0.08 INJECTION, SOLUTION INTRAMUSCULAR; INTRAVENOUS; SUBCUTANEOUS
Status: ACTIVE | OUTPATIENT
Start: 2025-02-22 | End: 2025-02-23

## 2025-02-22 RX ORDER — DOCUSATE SODIUM 100 MG/1
100 CAPSULE, LIQUID FILLED ORAL
Status: DISCONTINUED | OUTPATIENT
Start: 2025-02-22 | End: 2025-02-25

## 2025-02-22 RX ORDER — METHYLERGONOVINE MALEATE 0.2 MG/ML
INJECTION INTRAVENOUS
Status: DISPENSED
Start: 2025-02-22 | End: 2025-02-22

## 2025-02-22 RX ORDER — DIPHENHYDRAMINE HCL 25 MG
25 CAPSULE ORAL EVERY 4 HOURS PRN
Status: DISCONTINUED | OUTPATIENT
Start: 2025-02-22 | End: 2025-02-25

## 2025-02-22 RX ORDER — ACETAMINOPHEN 500 MG
1000 TABLET ORAL EVERY 6 HOURS SCHEDULED
Status: DISCONTINUED | OUTPATIENT
Start: 2025-02-22 | End: 2025-02-25

## 2025-02-22 RX ORDER — IBUPROFEN 600 MG/1
600 TABLET, FILM COATED ORAL EVERY 6 HOURS
Status: DISCONTINUED | OUTPATIENT
Start: 2025-02-23 | End: 2025-02-25

## 2025-02-22 RX ORDER — ONDANSETRON 2 MG/ML
4 INJECTION INTRAMUSCULAR; INTRAVENOUS EVERY 6 HOURS PRN
Status: DISCONTINUED | OUTPATIENT
Start: 2025-02-22 | End: 2025-02-25

## 2025-02-22 RX ADMIN — ONDANSETRON 4 MG: 2 INJECTION INTRAMUSCULAR; INTRAVENOUS at 11:43:00

## 2025-02-22 RX ADMIN — EPHEDRINE SULFATE 5 MG: 50 INJECTION INTRAVENOUS at 11:31:00

## 2025-02-22 RX ADMIN — MORPHINE SULFATE 0.2 MG: 2 INJECTION, SOLUTION INTRAMUSCULAR; INTRAVENOUS at 11:24:00

## 2025-02-22 RX ADMIN — PHENYLEPHRINE HCL 50 MCG: 10 MG/ML VIAL (ML) INJECTION at 11:41:00

## 2025-02-22 RX ADMIN — METHYLERGONOVINE MALEATE 0.2 MG: 0.2 INJECTION INTRAVENOUS at 11:47:00

## 2025-02-22 RX ADMIN — DEXAMETHASONE SODIUM PHOSPHATE 4 MG: 4 MG/ML VIAL (ML) INJECTION at 11:43:00

## 2025-02-22 RX ADMIN — EPHEDRINE SULFATE 10 MG: 50 INJECTION INTRAVENOUS at 11:43:00

## 2025-02-22 RX ADMIN — PHENYLEPHRINE HCL 50 MCG: 10 MG/ML VIAL (ML) INJECTION at 11:33:00

## 2025-02-22 RX ADMIN — BUPIVACAINE HYDROCHLORIDE 1.6 ML: 7.5 INJECTION, SOLUTION INTRASPINAL at 11:24:00

## 2025-02-22 NOTE — PROGRESS NOTES
Patient transferred via cart to NICU in stable condition to visit with infant. Patient then transferred to mother/baby room 2218 per cart in stable condition with baby and personal belongings.  Accompanied by  and staff.  Report given to mother/baby RN.

## 2025-02-22 NOTE — PLAN OF CARE
Problem: BIRTH - VAGINAL/ SECTION  Goal: Fetal and maternal status remain reassuring during the birth process  Description: INTERVENTIONS:  - Monitor vital signs  - Monitor fetal heart rate  - Monitor uterine activity  - Monitor labor progression (vaginal delivery)  - DVT prophylaxis (C/S delivery)  - Surgical antibiotic prophylaxis (C/S delivery)  Outcome: Completed     Problem: PAIN - ADULT  Goal: Verbalizes/displays adequate comfort level or patient's stated pain goal  Description: INTERVENTIONS:  - Encourage pt to monitor pain and request assistance  - Assess pain using appropriate pain scale  - Administer analgesics based on type and severity of pain and evaluate response  - Implement non-pharmacological measures as appropriate and evaluate response  - Consider cultural and social influences on pain and pain management  - Manage/alleviate anxiety  - Utilize distraction and/or relaxation techniques  - Monitor for opioid side effects  - Notify MD/LIP if interventions unsuccessful or patient reports new pain  - Anticipate increased pain with activity and pre-medicate as appropriate  Outcome: Completed     Problem: ANXIETY  Goal: Will report anxiety at manageable levels  Description: INTERVENTIONS:  - Administer medication as ordered  - Teach and rehearse alternative coping skills  - Provide emotional support with 1:1 interaction with staff  Outcome: Completed     Problem: Patient/Family Goals  Goal: Patient/Family Long Term Goal  Description: Patient's Long Term Goal: Uncomplicated Vaginal Delivery  Interventions:  Ice chips and sips as tolerated  Antibiotics as needed per protocol  Informed consent  - See additional Care Plan goals for specific interventions  Outcome: Completed  Goal: Patient/Family Short Term Goal  Description: Patient's Short Term Goal: Adequate Pain Control with Delivery of Infant  Interventions:  Patient scores pain a \"3\" or less  Multidisciplinary care   Nonpharmacologic comfort  measures  - See additional Care Plan goals for specific interventions  Outcome: Completed

## 2025-02-22 NOTE — ANESTHESIA PREPROCEDURE EVALUATION
PRE-OP EVALUATION    Patient Name: Mala Laurent    Admit Diagnosis: pregnancy  Pregnancy (HCC)    Pre-op Diagnosis: * No pre-op diagnosis entered *     SECTION    Anesthesia Procedure:  SECTION    Surgeons and Role:     * Jasper Low MD - Primary     * Wendy Portillo DO - Assisting Surgeon    Pre-op vitals reviewed.  Temp: 97.5 °F (36.4 °C)  Pulse: 89  Resp: 16  BP: 123/78     Body mass index is 35.88 kg/m².    Current medications reviewed.  Hospital Medications:   acetaminophen (Tylenol Extra Strength) tab 1,000 mg  1,000 mg Oral Once    oxyTOCIN in sodium chloride 0.9% (Pitocin) 30 Units/500mL infusion premix  62.5-900 kaity-units/min Intravenous Continuous    ceFAZolin (Ancef) 2g in 10mL IV syringe premix  2 g Intravenous Once    oxyTOCIN in sodium chloride 0.9% (Pitocin) 30 Units/500mL infusion premix        ceFAZolin (Ancef) 2 g/10mL IV syringe premix        HYDROmorphone (Dilaudid) 1 MG/ML injection 1 mg  1 mg Intravenous Q3H PRN    lamoTRIgine (LaMICtal) tab 250 mg  250 mg Oral BID    lactated ringers infusion   Intravenous Continuous    dextrose in lactated ringers 5% infusion   Intravenous PRN    lactated ringers IV bolus 500 mL  500 mL Intravenous PRN    acetaminophen (Tylenol Extra Strength) tab 500 mg  500 mg Oral Q6H PRN    acetaminophen (Tylenol Extra Strength) tab 1,000 mg  1,000 mg Oral Q6H PRN    ibuprofen (Motrin) tab 600 mg  600 mg Oral Once PRN    ondansetron (Zofran) 4 MG/2ML injection 4 mg  4 mg Intravenous Q6H PRN    oxyTOCIN in sodium chloride 0.9% (Pitocin) 30 Units/500mL infusion premix  62.5-900 kaity-units/min Intravenous Continuous    terbutaline (Brethine) 1 MG/ML injection 0.25 mg  0.25 mg Subcutaneous PRN    [COMPLETED] sodium citrate-citric acid (Bicitra) 500-334 MG/5ML oral solution 30 mL  30 mL Oral PRN    ropivacaine (Naropin) 0.5% injection  30 mL Injection PRN    calcium carbonate (Tums) chewable tab 1,000 mg  1,000 mg Oral Q4H PRN    [COMPLETED]  ceFAZolin (Ancef) 2g in 10mL IV syringe premix  2 g Intravenous Once    Followed by    ceFAZolin (Ancef) 1 g in dextrose 5% 100mL IVPB-ADD  1 g Intravenous Q8H    oxyTOCIN in sodium chloride 0.9% (Pitocin) 30 Units/500mL infusion premix  0.5-20 kaity-units/min Intravenous Continuous       Outpatient Medications:   Prescriptions Prior to Admission[1]    Allergies: Pcn [penicillins]      Anesthesia Evaluation    Patient summary reviewed.    Anesthetic Complications  (-) history of anesthetic complications         GI/Hepatic/Renal    Negative GI/hepatic/renal ROS.                             Cardiovascular    Negative cardiovascular ROS.    Exercise tolerance: good     MET: >4                                           Endo/Other    Negative endo/other ROS.                              Pulmonary    Negative pulmonary ROS.                       Neuro/Psych    Negative neuro/psych ROS.                          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  Lab Results   Component Value Date    WBC 10.1 2025    RBC 3.71 (L) 2025    HGB 11.1 (L) 2025    HCT 33.3 (L) 2025    MCV 89.8 2025    MCH 29.9 2025    MCHC 33.3 2025    RDW 12.7 2025    .0 2025               Airway      Mallampati: I  Mouth  opening: 3 FB  TM distance: 4 - 6 cm  Neck ROM: full Cardiovascular    Cardiovascular exam normal.  Rhythm: regular  Rate: normal  (-) murmur   Dental             Pulmonary    Pulmonary exam normal.  Breath sounds clear to auscultation bilaterally.               Other findings              ASA: 2   Plan: spinal  NPO status verified and patient meets guidelines.    Post-procedure pain management plan discussed with surgeon and patient.    Comment: Risks and benefits of neuraxial anesthesia discussed with patient, including but not limited to bleeding, infection, and PDPH.  GA as backup anesthetic planned and discussed with patient.  Possible tooth/airway injury, complications with intubation and airway management discussed.     Plan/risks discussed with: patient                Present on Admission:  **None**             [1]   Medications Prior to Admission   Medication Sig Dispense Refill Last Dose/Taking    lamoTRIgine (LAMICTAL) 25 MG Oral Tab Take 1 tablet (25 mg total) by mouth 2 (two) times daily. (Patient taking differently: Take 2 tablets (50 mg total) by mouth 2 (two) times daily.) 180 tablet 3 2/20/2025 Evening    lamoTRIgine (LAMICTAL) 200 MG Oral Tab Take 1 tablet (200 mg total) by mouth 2 (two) times daily. 180 tablet 3 2/20/2025 Evening    Prenatal Multivit-Min-Fe-FA (PRENATAL VITAMINS OR) Take by mouth daily.   2/20/2025    cetirizine 10 MG Oral Tab Take 1 tablet (10 mg total) by mouth daily. 1 tablet daily PRN   2/20/2025    folic acid 400 MCG Oral Tab Take 400 mcg by mouth daily.       ALPRAZOLAM 0.25 MG Oral Tab TAKE 1 TABLET(0.25 MG) BY MOUTH EVERY 6 HOURS AS NEEDED FOR ANXIETY (Patient not taking: Reported on 10/8/2024) 30 tablet 0     DiphenhydrAMINE HCl 25 MG Oral Cap Take 25 mg by mouth every 6 (six) hours as needed for Itching. (Patient not taking: Reported on 10/8/2024)

## 2025-02-22 NOTE — PROGRESS NOTES
MOM ADMISSION NOTE:  Report received and ID Bands checked & verified with: Ana WEISS  Patient admitted to room in stable condition via: cart    Oriented to room, safety precautions initiated, bed in low position, and call light in reach.   Plan of care and safety instructions reviewed, teaching sheets at bedside. VS and assessment initiated.

## 2025-02-22 NOTE — OPERATIVE REPORT
Mala Laurent Patient Status:  Inpatient    1990 MRN [id number]   [facility] LABOR & DELIVERY Attending Lesia Trejo MD   Hosp Day # 2 PCP Adonay Black MD     Preop Dx:   term pregnancy, failed induction  Postop Dx:  Same  Procedure: Primary Low transverse  section   Surgeon: RAD Low M.D.  Assistant: LIS Martin   Anesthesia:   Spinal    Indications:  This patient is a 34 year old y/o  woman.  The procedures , risks and complications were discussed with the patient including but not limited to infection, hemorrhage, blood transfusion, bowel bladder and ureteral injury, DVT and anesthetic risks.  Her questions were answered.             Procedure:  The surgical assist, Dr. Martin, was an integral part of the procedure. They were necessary to provide a safe outcome and to aid in adequate hemostasis. The patient was prepped and draped in a sterile fashion after satisfactory spinal anesthesia was given.  A transverse skin incision was made with a scalpel and extended to the fascia.  The fascia was incised in the midline with a scalpel then the incision extended laterally with a parker scissors.  The fascia was dissected from the muscles both inferiorly and superiorly with sharp and blunt dissection.  The peritoneum was elevated in incised with a justin and extended superiorly and inferiorly with good visualization of the bladder , an Trent retractor was placed in the abdominal cavity and opened. A bladder flap created.  The uterus was incised partway through with a scalpel, entered bluntly with the tip of my finger and the uterine incision was extended bilaterally with cephalocaudal manual traction. Membranes were ruptured bluntly. The head was then delivered and the mouth and nose were suctioned with a bulb syringe.   The remainder of the baby was delivered easily and the cord clamped and cut, then the male infant taken to the warmer where Neonatology was in  attendance.  Cord blood was obtained. Cord gasses were not obtained. The placenta was delivered with fundal massage and was normal.  The uterus was left in situ.  The uterine cavity was cleaned and the incision closed in a running locking suture or number one chromic.  Additional sutures of 2-0 chromic were placed for hemostasis as needed.   The paracolic gutters were cleaned and the uterine incision inspected again for hemostasis. The ashok retractor was removed.  After noting excellent subfascial hemostasis, the fascia was closed with a running suture of #1 Vicryl.  The sub Q was inspected, bleeders cauterized and it was closed with running suture of 2-0 plain suture. The skin closed with running  4-0 monocryl sub Q sutures and steri strips.  The patient tolerated the procedure well with an 800 cc EBL and went to recovery in good condition.    Jasper Low MD  02/22/25

## 2025-02-22 NOTE — ANESTHESIA PROCEDURE NOTES
Spinal Block    Date/Time: 2/22/2025 11:22 AM    Performed by: Lb Nick MD  Authorized by: Lb Nick MD      General Information and Staff    Start Time:  2/22/2025 11:22 AM  End Time:  2/22/2025 11:24 AM  Anesthesiologist:  Lb Nick MD  Performed by:  Anesthesiologist  Patient Location:  OB  Site identification: surface landmarks    Preanesthetic Checklist: patient identified, IV checked, risks and benefits discussed, monitors and equipment checked, pre-op evaluation, timeout performed, anesthesia consent and sterile technique used      Procedure Details    Patient Position:  Sitting  Prep: ChloraPrep    Monitoring:  Cardiac monitor, heart rate and continuous pulse ox  Approach:  Midline  Location:  L3-4  Injection Technique:  Single-shot    Needle    Needle Type:  Sprotte  Needle Gauge:  24 G  Needle Length:  3.5 in    Assessment    Sensory Level:  T6  Events: clear CSF, CSF aspirated, well tolerated and blood negative      Additional Comments

## 2025-02-22 NOTE — PROGRESS NOTES
@ 39w3d IOL for suspected macrosomia  S/p 4 doses of cytotec  Now has been on pitocin since 1320 with no change in dilation  Discussed cook catheter with patient, she is agreeable  Cook catheter placed at bedside with palpation  Uterine and vaginal balloon inflated with 80cc of saline  Patient tolerated well  Await expulsion        Monica Abreu MD

## 2025-02-22 NOTE — PROGRESS NOTES
SUBJECTIVE:   pt without complaints.  Comfortable s/p epidural    OBJECTIVE:  VS:  height is 5' 8\" (1.727 m) and weight is 236 lb (107 kg). Her oral temperature is 97.5 °F (36.4 °C). Her blood pressure is 123/78 and her pulse is 89. Her respiration is 16.   Chest-LCTA B  CVS- RRR no Murmur  Fetal Surveillance:  Fetal heart variability: moderate  Fetal Heart Rate accelerations: yes  Baseline FHR: 140 per minute  Uterine contractions: regular, every 3-4 minutes  [employer] cervical balloon removed after being in for 10 hours  Cervix:  Dilation:1cm  Effacement:25%  Station:-3    ASSESSMENT/PLAN:    1.34 year old y/o, [id number] 39w4d  2. FWB-reassuring  3. Procedure to be performed- primary Low Transverse  Section  4. Indication- failed induction, suspected fetal macrosomia  5. The procedure, its risks, benefits, possible complications and alternatives discussed with the patient.  She understands and agrees to the procedure.

## 2025-02-22 NOTE — ANESTHESIA POSTPROCEDURE EVALUATION
[facility]    Mala Laurent Patient Status:  Inpatient   Age/Gender 34 year old female MRN [id number]   Location [facility] LABOR & DELIVERY Attending Lesia Trejo MD   Hosp Day # 2 PCP Adonay Black MD       Anesthesia Post-op Note     SECTION    Procedure Summary       Date: 25 Room / Location:  L+D OR   L+D OR    Anesthesia Start: 1117 Anesthesia Stop: 1211    Procedure:  SECTION Diagnosis: (failed induction, 39w4d gestation, suspected macrosomia)    Surgeons: Jasper Low MD Anesthesiologist: Lb Nick MD    Anesthesia Type: spinal ASA Status: 2            Anesthesia Type: spinal    Vitals Value Taken Time   BP 92/71 25 1210   Temp 98 25 1212   Pulse 84 25 1212   Resp 14 25 1212   SpO2 94 % 25 1212   Vitals shown include unfiled device data.        Patient Location: Labor and Delivery    Anesthesia Type: spinal    Airway Patency: patent and extubated    Postop Pain Control: adequate    Mental Status: preanesthetic baseline    Nausea/Vomiting: none    Cardiopulmonary/Hydration status: stable euvolemic    Complications: no apparent anesthesia related complications    Postop vital signs: stable    Dental Exam: Unchanged from Preop    Patient to be discharged from PACU when criteria met.

## 2025-02-23 PROBLEM — O36.8390 NON-REASSURING ELECTRONIC FETAL MONITORING TRACING (HCC): Status: ACTIVE | Noted: 2025-02-23

## 2025-02-23 LAB
BASOPHILS # BLD AUTO: 0.03 X10(3) UL (ref 0–0.2)
BASOPHILS NFR BLD AUTO: 0.2 %
EOSINOPHIL # BLD AUTO: 0.09 X10(3) UL (ref 0–0.7)
EOSINOPHIL NFR BLD AUTO: 0.7 %
ERYTHROCYTE [DISTWIDTH] IN BLOOD BY AUTOMATED COUNT: 12.9 %
HCT VFR BLD AUTO: 28.5 %
HGB BLD-MCNC: 9.9 G/DL
IMM GRANULOCYTES # BLD AUTO: 0.13 X10(3) UL (ref 0–1)
IMM GRANULOCYTES NFR BLD: 1 %
LYMPHOCYTES # BLD AUTO: 2.59 X10(3) UL (ref 1–4)
LYMPHOCYTES NFR BLD AUTO: 19.8 %
MCH RBC QN AUTO: 30.8 PG (ref 26–34)
MCHC RBC AUTO-ENTMCNC: 34.7 G/DL (ref 31–37)
MCV RBC AUTO: 88.8 FL
MONOCYTES # BLD AUTO: 0.89 X10(3) UL (ref 0.1–1)
MONOCYTES NFR BLD AUTO: 6.8 %
NEUTROPHILS # BLD AUTO: 9.35 X10 (3) UL (ref 1.5–7.7)
NEUTROPHILS # BLD AUTO: 9.35 X10(3) UL (ref 1.5–7.7)
NEUTROPHILS NFR BLD AUTO: 71.5 %
PLATELET # BLD AUTO: 226 10(3)UL (ref 150–450)
RBC # BLD AUTO: 3.21 X10(6)UL
WBC # BLD AUTO: 13.1 X10(3) UL (ref 4–11)

## 2025-02-23 RX ORDER — METOCLOPRAMIDE HYDROCHLORIDE 5 MG/ML
10 INJECTION INTRAMUSCULAR; INTRAVENOUS EVERY 6 HOURS PRN
Status: DISCONTINUED | OUTPATIENT
Start: 2025-02-23 | End: 2025-02-25

## 2025-02-23 RX ORDER — FUROSEMIDE 10 MG/ML
20 INJECTION INTRAMUSCULAR; INTRAVENOUS ONCE
Status: COMPLETED | OUTPATIENT
Start: 2025-02-23 | End: 2025-02-23

## 2025-02-23 NOTE — PROGRESS NOTES
MD notified of Pt with what looks like coffee ground emesis x3 today.  First 2 episodes during day shift and last episode at 2150.  Md notified of emesis of 400ml at this time. New orders received at this time.  Pt updated on POC.  Abd not distended, pt without any complaints of being uncomfortable, pt states that she feels fine and then the emesis comes out of nowhere.  Pt states she has not had anything to eat except some ice chips and saltines.  Now, Pt strict NPO.

## 2025-02-23 NOTE — PROGRESS NOTES
[facility]    Mala Laurent Patient Status:  Inpatient   Age/Gender 34 year old female MRN [id number]   Location [facility] Attending Lesia Trejo MD   Hosp Day # 3 PCP Adonay Black MD      Anesthesia Pain Progress Note    Anesthesia Technique:   Spinal Anesthesia     Pain Management Technique:  In addition to available oral supplemental and IV medications  Patient received neuraxial preservative free morphine for post procedural pain control.    Post Procedure Pain Quality:    Adequate    Pain Management Side Effects:  None     /70 (BP Location: Right arm)   Pulse 81   Temp 97.6 °F (36.4 °C) (Oral)   Resp 18   Ht 1.727 m (5' 8\")   Wt 107 kg (236 lb)   LMP 2024   SpO2 95%   Breastfeeding Yes   BMI 35.88 kg/m²       Injection Site: Injection site is intact on inspection     Complications from Pain Management or Anesthesia:   None    All patient questions were answered.  Follow up pain management is separate from intraoperative anesthetic needs.  Pain care is transitioned to primary service, with management by oral medications.    Thank you for asking us to participate in the care of your patient.    Iraj Yoo DO, 25, 8:45 AM      Iraj Yoo DO, 25, 8:45 AM

## 2025-02-23 NOTE — PROGRESS NOTES
S: pt without complaints.  no flatus. Emesis last night. None this am. Will attempt to advance diet. Emesis reported by RN as \"coffee grounds\".   O: VS-Temp:  [97.1 °F (36.2 °C)-98.6 °F (37 °C)] 98.2 °F (36.8 °C)  Pulse:  [] 91  Resp:  [15-24] 16  BP: ()/(58-89) 112/59  SpO2:  [91 %-100 %] 95 %       I/O last 24 hours-  Intake/Output Summary (Last 24 hours) at 2/23/2025 0812  Last data filed at 2/23/2025 0639  Gross per 24 hour   Intake 4034.01 ml   Output 2515 ml   Net 1519.01 ml          Abdomen: soft, ND, NT  Incision- CDI       Extremities: 2+ edema, NT Bilateral lower extremities       CBC:   Lab Results   Component Value Date    WBC 13.1 02/23/2025    RBC 3.21 02/23/2025    HGB 9.9 02/23/2025    HCT 28.5 02/23/2025    MCV 88.8 02/23/2025    MCH 30.8 02/23/2025    MCHC 34.7 02/23/2025    RDW 12.9 02/23/2025    .0 02/23/2025     A/P:      1. POD#1 s/p C/S      2. Doing well      3. Due to emesis. Added reglan and famotidine. Stable at present. If another emesis, will send for hemoccult testing

## 2025-02-24 VITALS
WEIGHT: 236 LBS | HEIGHT: 68 IN | OXYGEN SATURATION: 100 % | TEMPERATURE: 98 F | HEART RATE: 74 BPM | BODY MASS INDEX: 35.77 KG/M2 | DIASTOLIC BLOOD PRESSURE: 68 MMHG | RESPIRATION RATE: 16 BRPM | SYSTOLIC BLOOD PRESSURE: 137 MMHG

## 2025-02-24 PROCEDURE — 3E0134Z INTRODUCTION OF SERUM, TOXOID AND VACCINE INTO SUBCUTANEOUS TISSUE, PERCUTANEOUS APPROACH: ICD-10-PCS | Performed by: OBSTETRICS & GYNECOLOGY

## 2025-02-24 RX ORDER — FAMOTIDINE 20 MG/1
20 TABLET, FILM COATED ORAL 2 TIMES DAILY
Status: DISCONTINUED | OUTPATIENT
Start: 2025-02-24 | End: 2025-02-25

## 2025-02-24 NOTE — PROGRESS NOTES
Postop Day 3    Pt without complaints.  Baby in NICU.    Temp: 98.3 °F (36.8 °C)  Pulse: 75  Resp: 18  BP: 121/64  abd  soft, NT, ND, fundus firm below umbilicus, incision C/D/I  extr  trace edema, no calf tenderness    Impression: POD#3  Plan:  Continue postop care.    Anticipate discharge home tomorrow.

## 2025-02-25 RX ORDER — FUROSEMIDE 20 MG/1
20 TABLET ORAL DAILY
Qty: 4 TABLET | Refills: 0 | Status: SHIPPED | OUTPATIENT
Start: 2025-02-25

## 2025-02-25 RX ORDER — FUROSEMIDE 20 MG/1
20 TABLET ORAL DAILY
Status: DISCONTINUED | OUTPATIENT
Start: 2025-02-25 | End: 2025-02-25

## 2025-02-25 NOTE — PROGRESS NOTES
Patient complaints: feels well    Vital signs reviewed    Examination:  General: alert, appropriate affect  Abdomen: Fundus firm and no unexpected tenderness.  Remainder of abdomen unremarkable  Incision: dry, intact, no unexpected findings  Lochia: appropriate  Extremities: nontender, no excessive edema, symmetric    Recent Labs   Lab 25  0729   RBC 3.71* 3.21*   HGB 11.1* 9.9*   HCT 33.3* 28.5*   MCV 89.8 88.8   MCH 29.9 30.8   MCHC 33.3 34.7   RDW 12.7 12.9   NEPRELIM 7.00 9.35*   WBC 10.1 13.1*   .0 226.0           Impression:   Postoperative day #3 from  birth, recuperating appropriately, anticipate routine discharge  Home today w/instructions

## 2025-02-25 NOTE — DISCHARGE SUMMARY
Reason for Admission: pregnancy      Hospital Course:   followed by uncomplicated postop course    Complications: none    Disposition: Home or Self Care    Discharge Condition: Good    Discharge Medications:  motrin, tylenol, lasix    Follow up Visits: Follow-up with MD office in  2 weeks    Other Discharge Instructions:      Admission: 2025    Discharge date: 25    Diet: general

## 2025-02-25 NOTE — PROGRESS NOTES
Patient discharged home in stable condition accompanied by **spouse**. Questions/concerns addressed and answered, discharge instructions given

## 2025-02-25 NOTE — DISCHARGE INSTRUCTIONS
FOR PAIN:  Ibuprofen 600 mg every 6 hours as needed  6:00 and 12:00    Tylenol Extra Strength 1-2 tabs every 6 hours as needed  9:00 and 3:00

## 2025-02-26 NOTE — PAYOR COMM NOTE
--------------  DISCHARGE REVIEW    Payor: [facility]  Subscriber #:  [id number]  Authorization Number: [id number]    Admit date: 25  Admit time:   7:15 PM  Discharge Date: 2025  2:43 PM     Admitting Physician: Lesia Trejo MD  Attending Physician:  No att. providers found  Primary Care Physician: Adonay Black MD          Discharge Summary Notes        Discharge Summary signed by Jose Miguel Asher MD at 2025  9:12 AM       Author: Jose Miguel Asher MD Specialty: OBSTETRICS & GYNECOLOGY Author Type: Physician    Filed: 2025  9:12 AM Date of Service: 2025  9:12 AM Status: Signed    : Jose Miguel Asher MD (Physician)         Reason for Admission: pregnancy      Hospital Course:   followed by uncomplicated postop course    Complications: none    Disposition: Home or Self Care    Discharge Condition: Good    Discharge Medications:  motrin, tylenol, lasix    Follow up Visits: Follow-up with MD office in  2 weeks    Other Discharge Instructions:      Admission: 2025    Discharge date: 25    Diet: general      Electronically signed by Jose Miguel Asher MD on 2025  9:12 AM         REVIEWER COMMENTS

## 2025-02-27 ENCOUNTER — TELEPHONE (OUTPATIENT)
Dept: OBGYN UNIT | Facility: HOSPITAL | Age: 35
End: 2025-02-27

## 2025-02-27 NOTE — PROGRESS NOTES
Cradle call completed. Mom and baby care reviewed. All questions answered. Cradle call letters sent via [employer].

## (undated) DEVICE — SCD SLEEVE KNEE HI BLEND

## (undated) DEVICE — CANISTER SAFETOUCH SYST DISP

## (undated) DEVICE — TUBING SUCTION COLLECTION SET

## (undated) DEVICE — LARGE, DISPOSABLE ALEXIS O C-SECTION PROTECTOR - RETRACTOR: Brand: ALEXIS ® O C-SECTION PROTECTOR - RETRACTOR

## (undated) DEVICE — CURRETTE 7MM CVD

## (undated) DEVICE — STERILE POLYISOPRENE POWDER-FREE SURGICAL GLOVES: Brand: PROTEXIS

## (undated) DEVICE — GYN CDS: Brand: MEDLINE INDUSTRIES, INC.

## (undated) DEVICE — SOL  .9 1000ML BTL

## (undated) NOTE — LETTER
2024      Jose Miguel Asher MD  [address]  [address]  [facility] [address]  Via Outside Provider Messaging  Patient: Mala Laurent  : 1990    Dear Colleague:  Thank you for referring your patient to me for a Maternal Fetal Medicine evaluation. Please see my attached note for my findings and recommendations.      Should you have any questions or concerns, please do not hesitate to contact me at the number listed below.    Best Regards,      Marjorie Cui MD  [facility]  [address]  [facility] [address]  [phone]    cc: No Recipients      [facility] Department of Maternal Fetal Medicine  Patient Name: Mala Laurent  Patient : 1990  Physician: Marjorie Cui MD    Outpatient Maternal-Fetal Medicine Consultation    Dear Dr. Asher,    Thank you for requesting ultrasound evaluation and maternal fetal medicine consultation on your patient Mala Laurent.  As you are aware she is a 34 year old female with a Tong pregnancy at 20w0d.  A maternal-fetal medicine consultation was requested secondary to advanced maternal age and seat maternal seizure disorder.  Her prenatal records and labs were reviewed.    HISTORY  OB History    Para Term  AB Living   2 0 0 0 1 0   SAB IAB Ectopic Multiple Live Births   1 0 0 0 0     # 1 - Date: 22, Sex: None, Weight: None, GA: None, Type: None, Apgar1: None, Apgar5: None, Living: None, Birth Comments: D&C    # 2 - Date: None, Sex: None, Weight: None, GA: None, Type: None, Apgar1: None, Apgar5: None, Living: None, Birth Comments: None    Past Medical History  The patient  has a past medical history of PONV (postoperative nausea and vomiting), Seizure (HCC), and TBI (traumatic brain injury) ([facility]).    She has no past medical history of Difficult intubation, Malignant hyperthermia, or Pseudocholinesterase deficiency.    Past Surgical History  The patient  has a past surgical history that  includes Brain Surgery.    Family History  The patient She indicated that her mother is alive. She indicated that her father is alive.      Medications:   Current Outpatient Medications:     lamoTRIgine (LAMICTAL) 25 MG Oral Tab, Take 1 tablet (25 mg total) by mouth 2 (two) times daily., Disp: 180 tablet, Rfl: 3    lamoTRIgine (LAMICTAL) 200 MG Oral Tab, Take 1 tablet (200 mg total) by mouth 2 (two) times daily., Disp: 180 tablet, Rfl: 3    Prenatal Multivit-Min-Fe-FA (PRENATAL VITAMINS OR), Take by mouth daily., Disp: , Rfl:     cetirizine 10 MG Oral Tab, Take 1 tablet (10 mg total) by mouth daily. 1 tablet daily PRN, Disp: , Rfl:     folic acid 400 MCG Oral Tab, Take 400 mcg by mouth daily., Disp: , Rfl:     ALPRAZOLAM 0.25 MG Oral Tab, TAKE 1 TABLET(0.25 MG) BY MOUTH EVERY 6 HOURS AS NEEDED FOR ANXIETY (Patient not taking: Reported on 10/8/2024), Disp: 30 tablet, Rfl: 0    DiphenhydrAMINE HCl 25 MG Oral Cap, Take 25 mg by mouth every 6 (six) hours as needed for Itching. (Patient not taking: Reported on 10/8/2024), Disp: , Rfl:   Allergies:   Allergies   Allergen Reactions    Pcn [Penicillins] RASH         PHYSICAL EXAMINATION:  /64 (BP Location: Right arm, Patient Position: Sitting, Cuff Size: adult)   Pulse 78   Ht 5' 8\" (1.727 m)   Wt 188 lb (85.3 kg)   LMP 05/21/2024   BMI 28.59 kg/m²   General: alert and oriented,no acute distress  Abdomen: gravid, soft, non-tender  Extremities: non-tender, no edema      OBSTETRIC ULTRASOUND  The patient had a level 2 ultrasound today which I interpreted the results and reviewed them with the patient.    Ultrasound Findings:  Single IUP in cephalic presentation.    Placenta is anterior.   A 3 vessel cord is noted.  Cardiac activity is present at 148 bpm   g ( 0 lb 14 oz);   MVP is 3.7 cm .   The profile appears normal but suboptimal due to fetal position.    The fetal measurements are consistent with the established EDC. No ultrasound evidence of structural  abnormalities are seen today. The nasal bone is present. No ultrasound evidence of markers for aneuploidy are seen. She understands that ultrasound exam cannot exclude genetic abnormalities and that genetic testing is recommended. The limitations of ultrasound were discussed.     Uterus and adnexa appeared normal  today on US    See imaging tab for the complete US report.    DISCUSSION  During her visit we discussed and reviewed the following issues:  Seizure d/o secondary to traumatic brain injury -     Lamictal has been found to decrease folate concentrations in animal studies. Teratogenic effects in animals were not observed. Lamictal crosses the human placenta and can be measured in the plasma of exposed newborns. Preliminary data from the [place] American Antiepileptic Drug Pregnancy Registry (NAAED) suggest an increased incidence of cleft lip and/or cleft palate following first trimester exposure. Healthcare providers may enroll patients in the Lamotrigine(Lamictal ) Pregnancy Registry by calling [phone].  Lamictal is not recommended during breastfeeding.     ADVANCED MATERNAL AGE    Background  I reviewed with the patient that pregnancies in women of advanced maternal age (35 or older at delivery) are associated with elevated risks. Specifically, there is a higher rate of:  Fetal malformations  Preeclampsia  Gestational diabetes  Intrauterine fetal death    As a result, enhanced pregnancy surveillance is advised for these patients including a comprehensive ultrasound to assess for fetal malformations (at 20 weeks) and a third trimester ultrasound assessment for fetal growth (at 32 weeks). In addition, weekly NST's (initiating at 36 weeks gestation for women 35-39 years and at 32 weeks gestation for women 40 years and older) are also advised. Routine obstetric care is more than adequate to assess for gestational diabetes and preeclampsia; hence, no further significant alterations in obstetric care are  advised.    Medical Complications    Women 35 years of age or older can expect to experience two to three fold higher rates of hospitalization,  delivery, and pregnancy-related complications when compared to their younger counterparts.  The two most common medical problems complicating these  pregnanccies are hypertension and diabetes.   The incidence of preeclampsia in the general obstetric population is 3 to 4 percent; this increases to 5 to 10 percent in women over age 40 and is as high as 35 percent in women over age 50.   The incidence of gestational diabetes in the general obstetric population is 3 percent, rising to 7 to 12 percent in women over age 40 and 20 percent in women over age 50.  Women 35 years of age or older are more likely to be delivered by . The  delivery rate in the general obstetric population of the [place] is almost 30 percent, compared to almost 50 percent in women over age 40 to 45 and almost 80 percent in women age 50 to 63.          Fetal Death        A decision analysis tool using data from the [place] [employer]et[employer]  Database predicted a strategy of weekly antepartum testing and labor induction would lower the risk of unexplained fetal death in women 35 years of age or older. In this model, weekly testing starting at 36 weeks of gestation would drop the risk of fetal death from 5.2 to 1.3 per 1000 pregnancies. While a policy of antepartum testing in older women does increase the chance that a women will be induced (71 inductions per fetal death averted) and thereby increases her risk of having a  delivery, only 14 additional cesareans would need to be performed to avert one unexplained fetal death.  Hence, weekly NST's are advised for women of advanced maternal age; testing should be initiated at 36 weeks for women 35-39 years and at 32 weeks for women 40 years and older.    Fetal Malformations    Cardiac malformations, clubfoot, and  diaphragmatic hernia appear to occur with increased frequency in offspring of older women. These abnormalities are structural and unrelated to aneuploidy, thus they would not be detected by karyotype analysis.  For these reasons a complete, detailed ultrasound (level II) is advised even if the fetus has a normal karyotype.      Fetal Aneuploidy  We also discussed the increased risk of chromosomal abnormalities associated with advanced maternal age. I reviewed that an ultrasound examination cannot reliably exclude potential genetic abnormalities. Given that the patient will be 34 years old at the time of delivery I reviewed that her risk (at amniocentesis) of having a fetus with any chromosome abnormality is 1:100 and with trisomy 21 is 1: 190.    Invasive Testing  I offered invasive genetic testing (amniocentesis, chorionic villus sampling) after reviewing the diagnostic accuracy of these tests as well as the procedure associated loss rate (1:500 for genetic amniocentesis).    She ultimately does not desire invasive genetic testing.     Non-invasive Pregnancy Testing (NIPT) -we reviewed her Loiselle free DNA screen and the role of level 2 ultrasound.  We discussed her low risk cell free DNA screen and her normal level II ultrasound today.  We discussed her option for amniocentesis but that the likelihood of finding a genetic concern is quite low after her other low risk evaluations.  She appropriately declined invasive prenatal genetic diagnostic testing.    We discussed the recommended plan of care based on her  risk factors.  Mala and her significant other, Efe, had their questions answered to their satisfaction.      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 Asher  She will continue to follow with her neurologist forFollow-up growth & BPP ultrasound at 32 weeks.  Weekly NST's at 36 weeks.  Intermittent Lamictal levels and dose adjustments as clinically needed      Total time spent 55 minutes this calendar day which includes preparing to see the patient including chart review, obtaining and/or reviewing additional medical history, performing a physical exam and evaluation, documenting clinical information in the electronic medical record, independently interpreting results, counseling the patient, communicating results to the patient/family/caregiver and coordinating care.     Case discussed with patient who demonstrated understanding and agreement with plan.     Thank you for allowing me to participate in the care of this patient.  Please feel free to contact me with any questions.    Marjorie Cui MD  Maternal-Fetal Medicine       Note to patient and family:  The 21st Century Cures Act makes medical notes available to patients in the interest of transparency.  However, please be advised that this is a medical document.  It is intended as a peer to peer communication.  It is written in medical language and may contain abbreviations or verbiage that are technical and unfamiliar.  It may appear blunt or direct.  Medical documents are intended to carry relevant information, facts as evident, and the clinical opinion of the practitioner.

## (undated) NOTE — LETTER
2025      Jose Miguel Asher MD  [address]  [address]  [facility] [address]  Via Outside Provider Messaging  Patient: Mala Laurent  : 1990    Dear Colleague:  Thank you for referring your patient to me for a Maternal Fetal Medicine evaluation. Please see my attached note for my findings and recommendations.      Should you have any questions or concerns, please do not hesitate to contact me at the number listed below.    Best Regards,      Jesus Ruiz MD  [facility]  [address]  [facility] [address]  [phone]    cc: No Recipients      [facility] Department of Maternal Fetal Medicine  Patient Name: Mala Laurent  Patient : 1990  Physician: Jesus Ruiz MD    Outpatient Maternal-Fetal Medicine Follow-Up    Dear Dr. Asher    Thank you for requesting ultrasound evaluation and maternal fetal medicine consultation on your patient Mala Laurent.  As you are aware she is a 34 year old female  with a grant pregnancy and an Estimated Date of Delivery: 25.  She returned to maternal-fetal medicine today for a follow-up visit.  Her history was reviewed from her prior visit and there were no interval changes.    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 Levy    PHYSICAL EXAMINATION:  /64 (BP Location: Right arm, Patient Position: Sitting, Cuff Size: adult)   Pulse 90   Ht 5' 8\" (1.727 m)   Wt 222 lb (100.7 kg)   LMP 2024   BMI 33.75 kg/m²   General: alert and oriented, no acute distress  Abdomen: gravid, soft, non-tender  Extremities: non-tender, no edema    OBSTETRIC ULTRASOUND    Ultrasound Findings:  Single IUP in cephalic presentation.    Placenta is anterior.   A 3 vessel cord is noted.  Cardiac activity is present at 126 bpm  EFW 2202 g ( 4 lb 14 oz); 64%.    RICARDO is  17.9  cm.  MVP is 6.4 cm  BPP is 8/8.     The fetal measurements are consistent with established EDC. No gross ultrasound evidence of structural abnormalities are seen today. The patient understands that ultrasound cannot rule out all structural and chromosomal abnormalities.     See PACS/Imaging Tab For Complete Ultrasound Report  I interpreted the results and reviewed them with the patient.    DISCUSSION  During her visit we discussed and reviewed the following issues:  ADVANCED MATERNAL AGE  See prior MFM notes for a detailed review.  She did not desire invasive genetic testing.   She has already obtained a low-risk NPIT result and was appropriately reassured.     SEIZURE DISORDER  See prior MFM notes for a detailed review.    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 Levy     RECOMMENDATIONS:  Continue care with Dr. Asher  Weekly NST's at 36 weeks.  Intermittent Lamictal levels and dose adjustments as clinically needed    Thank you for allowing me to participate in the care of your patient.  Please do not hesitate to contact me if additional questions or concerns arise.      Jesus Ruiz M.D.    20 minutes spent in review of records, patient consultation, documentation and coordination of care.  The relevant clinical matter(s) are summarized above.     Note to patient and family  The 21st Century Cures Act makes medical notes available to patients in the interest of transparency.  However, please be advised that this is a medical document.  It is intended as [id number] communication.  It is written and medical language may contain abbreviations or verbiage that are technical and unfamiliar.  It may appear blunt or direct.  Medical documents are intended to carry relevant information, facts as evident, and the clinical opinion of the practitioner.      Pt here for growth  ultrasound  + FM  No complaints